# Patient Record
Sex: MALE | Race: BLACK OR AFRICAN AMERICAN | NOT HISPANIC OR LATINO | URBAN - METROPOLITAN AREA
[De-identification: names, ages, dates, MRNs, and addresses within clinical notes are randomized per-mention and may not be internally consistent; named-entity substitution may affect disease eponyms.]

---

## 2021-12-20 ENCOUNTER — EMERGENCY (EMERGENCY)
Facility: HOSPITAL | Age: 31
LOS: 1 days | Discharge: LEFT BEFORE DISCHARGE | End: 2021-12-20
Attending: EMERGENCY MEDICINE
Payer: MEDICAID

## 2021-12-20 VITALS
HEIGHT: 78 IN | DIASTOLIC BLOOD PRESSURE: 94 MMHG | RESPIRATION RATE: 18 BRPM | TEMPERATURE: 98 F | WEIGHT: 265 LBS | HEART RATE: 105 BPM | SYSTOLIC BLOOD PRESSURE: 144 MMHG

## 2021-12-20 PROCEDURE — L9991: CPT
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
## 2021-12-20 NOTE — ED PROVIDER NOTE - INTERNATIONAL TRAVEL
medications    Medication Sig Start Date End Date Taking? Authorizing Provider   traMADol (ULTRAM) 50 MG tablet Take 1 tablet by mouth every 6 hours as needed for Pain for up to 30 days. 5/22/20 6/21/20 Yes VALERIE Robison CNP   docusate sodium (COLACE) 100 MG capsule Take 100 mg by mouth daily   Yes Historical Provider, MD   albuterol sulfate HFA (PROAIR HFA) 108 (90 Base) MCG/ACT inhaler Inhale 2 puffs into the lungs every 6 hours as needed for Wheezing 3/2/20  Yes Radha Perrin MD   ipratropium-albuterol (DUONEB) 0.5-2.5 (3) MG/3ML SOLN nebulizer solution INHALE THREE MILLILITERS (ONE VIAL) VIA NEBULIZATION BY MOUTH EVERY 4 HOURS AS NEEDED FOR SHORTNESS OF BREATH 3/2/20  Yes Radha Perrin MD   meloxicam (MOBIC) 15 MG tablet TAKE ONE TABLET BY MOUTH DAILY WITH FOOD FOR PAIN 2/24/20  Yes VALERIE Liang CNP   metFORMIN (GLUCOPHAGE) 500 MG tablet Take 1 tablet by mouth daily (with breakfast) 2/17/20  Yes VALERIE Liang CNP   hydroxyurea (HYDREA) 500 MG chemo capsule  7/1/19  Yes Historical Provider, MD   blood glucose monitor kit and supplies by Other route daily E11.9, FSBS daily. Meter preferred by insurance. 6/28/19  Yes VALERIE Robison CNP   blood glucose test strips (ASCENSIA AUTODISC VI;ONE TOUCH ULTRA TEST VI) strip DX: E11.9 FSBS daily. Meter preferred by insurance. 6/28/19  Yes VALERIE Liang CNP   Lancets MISC 1 each by Does not apply route daily DX: E 11.9. FSBS daily. Meter preferred by insurance.  6/28/19 1/16/21 Yes VALERIE Robison CNP   vitamin D (CHOLECALCIFEROL) 1000 UNIT TABS tablet Take 1,000 Units by mouth daily   Yes Historical Provider, MD   vitamin C (ASCORBIC ACID) 500 MG tablet Take 500 mg by mouth daily   Yes Historical Provider, MD   OXYGEN Inhale 1 L/min into the lungs continuous    Yes Historical Provider, MD   guaiFENesin (MUCINEX) 600 MG extended release tablet Take 1 tablet by mouth 2 times daily 6/23/18  Yes Lacho Hirsch MD   Multiple Vitamins-Minerals (THERAPEUTIC MULTIVITAMIN-MINERALS) tablet Take 1 tablet by mouth daily   Yes Historical Provider, MD   aspirin 81 MG tablet Take 81 mg by mouth daily  1/14/16  Yes Historical Provider, MD       Social history:  reports that he quit smoking about 2 years ago. His smoking use included cigarettes. He started smoking about 43 years ago. He has a 40.00 pack-year smoking history. He has never used smokeless tobacco. He reports previous alcohol use of about 1.0 standard drinks of alcohol per week. He reports that he does not use drugs. Family history:    Family History   Problem Relation Age of Onset    Diabetes Mother     Cancer Sister         pancreatic    Emphysema Father     No Known Problems Brother     No Known Problems Sister          ROS  Review of Systems   Constitutional: Negative for chills and fever. HENT: Negative for congestion and rhinorrhea. Eyes: Negative for photophobia and visual disturbance. Respiratory: Positive for chest tightness, shortness of breath and wheezing. Negative for cough. Cardiovascular: Negative for chest pain and palpitations. Gastrointestinal: Negative for abdominal pain, diarrhea, nausea and vomiting. Genitourinary: Negative for dysuria and hematuria. Musculoskeletal: Negative for back pain and neck pain. Skin: Negative for rash and wound. Neurological: Negative for syncope and weakness. Psychiatric/Behavioral: Negative for agitation and confusion. Exam  ED Triage Vitals   BP Temp Temp src Pulse Resp SpO2 Height Weight   -- -- -- -- -- -- -- --       Physical Exam  Vitals signs and nursing note reviewed. Constitutional:       General: He is not in acute distress. Appearance: He is well-developed. HENT:      Head: Normocephalic and atraumatic. Eyes:      Extraocular Movements: Extraocular movements intact. Pupils: Pupils are equal, round, and reactive to light.    Neck: Musculoskeletal: Normal range of motion and neck supple. Cardiovascular:      Rate and Rhythm: Normal rate and regular rhythm. Heart sounds: No murmur. Pulmonary:      Effort: Pulmonary effort is normal.      Comments: Speaking in near complete sentences. No significant respiratory distress. Coarse breath sounds with bilateral wheezing, prolonged expiratory phase. No focal findings otherwise. Abdominal:      General: There is no distension. Palpations: Abdomen is soft. Tenderness: There is no abdominal tenderness. Musculoskeletal: Normal range of motion. General: No deformity. Comments: Mild bilateral pitting edema, no tenderness. Skin:     General: Skin is warm. Findings: No rash. Neurological:      Mental Status: He is alert and oriented to person, place, and time. Motor: No abnormal muscle tone. Psychiatric:         Behavior: Behavior normal.           ED Course    ED Medication Orders (From admission, onward)    Start Ordered     Status Ordering Provider    06/04/20 1600 06/04/20 1336  ipratropium-albuterol (DUONEB) nebulizer solution 1 ampule  EVERY 4 HOURS WHILE AWAKE      Last MAR action:  Given - by Day HESTER on 06/04/20 at 1344 ANDRE CONDON    06/04/20 1515 06/04/20 1503  albuterol (PROVENTIL) nebulizer solution 7.5 mg  ONCE      Ordered ANDRE CONDON    06/04/20 1515 06/04/20 1512  furosemide (LASIX) injection 20 mg  ONCE      Ordered ANDRE CONDON    06/04/20 1345 06/04/20 1336  predniSONE (DELTASONE) tablet 60 mg  ONCE      Last MAR action:  Given - by Day HESTER on 06/04/20 at 1344 ANDRE CONDON          EKG  Sinus tachycardia, rate 110, normal axis, normal intervals, no evidence of conduction abnormalities, no diagnostic ischemic changes, nonspecific ST changes flattening in the lateral leads, .       Radiology  Xr Chest Portable    Result Date: 6/4/2020  EXAMINATION: ONE XRAY VIEW OF THE CHEST 6/4/2020 1:33 pm is still significantly wheezy he is tachycardic up in the 120s with any effort. Chart review showing that he is normally on 2 L room air. I do feel he would benefit from continued observation further tune up likely for COPD exacerbation. Of note his BNP is 20,000 which does appear to be new. He has no obvious pulmonary edema on his x-ray. I doubt PE given the fact that he has mild bilateral edema in the lower extremities but otherwise no leg pain. More likely is pulmonary hypertension and COPD exacerbation. Will discuss with hospitalist to admit for further management. 3:13 PM    Discussed with hospitalist who agrees to accept patient for further care. He would like to assess patient prior to any PE study. I agree with mild diuresis so will order trial dose of 20 mg of Lasix here further recommendations. 3:13 PM      Clinical Impression:  1. Dyspnea, unspecified type    2. COPD exacerbation ([address]          Disposition:  Admit to telemetry in good condition. Blood pressure 99/75, pulse 54, temperature 98.4 °F (36.9 °C), temperature source Oral, resp. rate 16, height 5' 9\" (1.753 m), weight 112 lb (50.8 kg), SpO2 95 %. Patient was given scripts for the following medications. I counseled patient how to take these medications. New Prescriptions    No medications on file       Disposition referral (if applicable):  No follow-up provider specified. Total critical care time is 20 minutes, which excludes separately billable procedures and updating family. Time spent is specifically for management of the presenting complaint and symptoms initially, direct bedside care, reevaluation, review of records, and consultation. There was a high probability of clinically significant life-threatening deterioration in the patient's condition, which required my urgent intervention.      This chart was generated in part by using Dragon Dictation system and may contain errors related to that system including errors in No

## 2021-12-22 ENCOUNTER — TRANSCRIPTION ENCOUNTER (OUTPATIENT)
Age: 31
End: 2021-12-22
